# Patient Record
Sex: FEMALE | Race: WHITE
[De-identification: names, ages, dates, MRNs, and addresses within clinical notes are randomized per-mention and may not be internally consistent; named-entity substitution may affect disease eponyms.]

---

## 2018-03-01 ENCOUNTER — HOSPITAL ENCOUNTER (INPATIENT)
Dept: HOSPITAL 62 - ER | Age: 73
LOS: 5 days | Discharge: HOME | DRG: 389 | End: 2018-03-06
Attending: STUDENT IN AN ORGANIZED HEALTH CARE EDUCATION/TRAINING PROGRAM | Admitting: STUDENT IN AN ORGANIZED HEALTH CARE EDUCATION/TRAINING PROGRAM
Payer: MEDICARE

## 2018-03-01 DIAGNOSIS — Z79.02: ICD-10-CM

## 2018-03-01 DIAGNOSIS — Z85.3: ICD-10-CM

## 2018-03-01 DIAGNOSIS — Z88.5: ICD-10-CM

## 2018-03-01 DIAGNOSIS — Z90.710: ICD-10-CM

## 2018-03-01 DIAGNOSIS — Z88.8: ICD-10-CM

## 2018-03-01 DIAGNOSIS — K59.09: ICD-10-CM

## 2018-03-01 DIAGNOSIS — K56.699: Primary | ICD-10-CM

## 2018-03-01 DIAGNOSIS — Z90.12: ICD-10-CM

## 2018-03-01 DIAGNOSIS — N39.0: ICD-10-CM

## 2018-03-01 DIAGNOSIS — Z79.899: ICD-10-CM

## 2018-03-01 DIAGNOSIS — E87.1: ICD-10-CM

## 2018-03-01 DIAGNOSIS — M46.97: ICD-10-CM

## 2018-03-01 DIAGNOSIS — Z88.6: ICD-10-CM

## 2018-03-01 DIAGNOSIS — Z98.84: ICD-10-CM

## 2018-03-01 DIAGNOSIS — I48.91: ICD-10-CM

## 2018-03-01 DIAGNOSIS — E11.9: ICD-10-CM

## 2018-03-01 DIAGNOSIS — I10: ICD-10-CM

## 2018-03-01 LAB
ADD MANUAL DIFF: NO
ALBUMIN SERPL-MCNC: 4.1 G/DL (ref 3.5–5)
ALP SERPL-CCNC: 82 U/L (ref 38–126)
ALT SERPL-CCNC: 40 U/L (ref 9–52)
ANION GAP SERPL CALC-SCNC: 14 MMOL/L (ref 5–19)
APPEARANCE UR: CLEAR
APTT PPP: YELLOW S
AST SERPL-CCNC: 24 U/L (ref 14–36)
BASOPHILS # BLD AUTO: 0 10^3/UL (ref 0–0.2)
BASOPHILS NFR BLD AUTO: 0.3 % (ref 0–2)
BILIRUB DIRECT SERPL-MCNC: 0.1 MG/DL (ref 0–0.4)
BILIRUB SERPL-MCNC: 0.5 MG/DL (ref 0.2–1.3)
BILIRUB UR QL STRIP: NEGATIVE
BUN SERPL-MCNC: 19 MG/DL (ref 7–20)
CALCIUM: 10 MG/DL (ref 8.4–10.2)
CHLORIDE SERPL-SCNC: 98 MMOL/L (ref 98–107)
CO2 SERPL-SCNC: 24 MMOL/L (ref 22–30)
EOSINOPHIL # BLD AUTO: 0.1 10^3/UL (ref 0–0.6)
EOSINOPHIL NFR BLD AUTO: 0.7 % (ref 0–6)
ERYTHROCYTE [DISTWIDTH] IN BLOOD BY AUTOMATED COUNT: 14.1 % (ref 11.5–14)
GLUCOSE SERPL-MCNC: 124 MG/DL (ref 75–110)
GLUCOSE UR STRIP-MCNC: NEGATIVE MG/DL
HCT VFR BLD CALC: 41 % (ref 36–47)
HGB BLD-MCNC: 14 G/DL (ref 12–15.5)
KETONES UR STRIP-MCNC: NEGATIVE MG/DL
LIPASE SERPL-CCNC: 212.4 U/L (ref 23–300)
LYMPHOCYTES # BLD AUTO: 3 10^3/UL (ref 0.5–4.7)
LYMPHOCYTES NFR BLD AUTO: 29.6 % (ref 13–45)
MCH RBC QN AUTO: 30.3 PG (ref 27–33.4)
MCHC RBC AUTO-ENTMCNC: 34.1 G/DL (ref 32–36)
MCV RBC AUTO: 89 FL (ref 80–97)
MONOCYTES # BLD AUTO: 0.7 10^3/UL (ref 0.1–1.4)
MONOCYTES NFR BLD AUTO: 6.6 % (ref 3–13)
NEUTROPHILS # BLD AUTO: 6.5 10^3/UL (ref 1.7–8.2)
NEUTS SEG NFR BLD AUTO: 62.8 % (ref 42–78)
NITRITE UR QL STRIP: NEGATIVE
PH UR STRIP: 6 [PH] (ref 5–9)
PLATELET # BLD: 213 10^3/UL (ref 150–450)
POTASSIUM SERPL-SCNC: 4 MMOL/L (ref 3.6–5)
PROT SERPL-MCNC: 6.2 G/DL (ref 6.3–8.2)
PROT UR STRIP-MCNC: NEGATIVE MG/DL
RBC # BLD AUTO: 4.62 10^6/UL (ref 3.72–5.28)
SODIUM SERPL-SCNC: 135.5 MMOL/L (ref 137–145)
SP GR UR STRIP: 1.01
TOTAL CELLS COUNTED % (AUTO): 100 %
UROBILINOGEN UR-MCNC: NEGATIVE MG/DL (ref ?–2)
WBC # BLD AUTO: 10.3 10^3/UL (ref 4–10.5)

## 2018-03-01 PROCEDURE — 36415 COLL VENOUS BLD VENIPUNCTURE: CPT

## 2018-03-01 PROCEDURE — 93005 ELECTROCARDIOGRAM TRACING: CPT

## 2018-03-01 PROCEDURE — 76705 ECHO EXAM OF ABDOMEN: CPT

## 2018-03-01 PROCEDURE — 80053 COMPREHEN METABOLIC PANEL: CPT

## 2018-03-01 PROCEDURE — 87186 SC STD MICRODIL/AGAR DIL: CPT

## 2018-03-01 PROCEDURE — 83735 ASSAY OF MAGNESIUM: CPT

## 2018-03-01 PROCEDURE — 83605 ASSAY OF LACTIC ACID: CPT

## 2018-03-01 PROCEDURE — 93010 ELECTROCARDIOGRAM REPORT: CPT

## 2018-03-01 PROCEDURE — 81001 URINALYSIS AUTO W/SCOPE: CPT

## 2018-03-01 PROCEDURE — 96375 TX/PRO/DX INJ NEW DRUG ADDON: CPT

## 2018-03-01 PROCEDURE — 85027 COMPLETE CBC AUTOMATED: CPT

## 2018-03-01 PROCEDURE — 87086 URINE CULTURE/COLONY COUNT: CPT

## 2018-03-01 PROCEDURE — 74181 MRI ABDOMEN W/O CONTRAST: CPT

## 2018-03-01 PROCEDURE — 85025 COMPLETE CBC W/AUTO DIFF WBC: CPT

## 2018-03-01 PROCEDURE — 74177 CT ABD & PELVIS W/CONTRAST: CPT

## 2018-03-01 PROCEDURE — 80048 BASIC METABOLIC PNL TOTAL CA: CPT

## 2018-03-01 PROCEDURE — 96376 TX/PRO/DX INJ SAME DRUG ADON: CPT

## 2018-03-01 PROCEDURE — 99285 EMERGENCY DEPT VISIT HI MDM: CPT

## 2018-03-01 PROCEDURE — G0378 HOSPITAL OBSERVATION PER HR: HCPCS

## 2018-03-01 PROCEDURE — 84484 ASSAY OF TROPONIN QUANT: CPT

## 2018-03-01 PROCEDURE — 96365 THER/PROPH/DIAG IV INF INIT: CPT

## 2018-03-01 PROCEDURE — 74018 RADEX ABDOMEN 1 VIEW: CPT

## 2018-03-01 PROCEDURE — 87088 URINE BACTERIA CULTURE: CPT

## 2018-03-01 PROCEDURE — 83690 ASSAY OF LIPASE: CPT

## 2018-03-01 RX ADMIN — APIXABAN SCH MG: 5 TABLET, FILM COATED ORAL at 22:24

## 2018-03-01 RX ADMIN — Medication SCH ML: at 22:25

## 2018-03-01 RX ADMIN — AMLODIPINE BESYLATE SCH MG: 5 TABLET ORAL at 23:06

## 2018-03-01 RX ADMIN — SODIUM CHLORIDE PRN ML: 0.45 INJECTION, SOLUTION INTRAVENOUS at 23:06

## 2018-03-01 RX ADMIN — DEXAMETHASONE SODIUM PHOSPHATE PRN MG: 10 INJECTION INTRAMUSCULAR; INTRAVENOUS at 22:25

## 2018-03-01 NOTE — RADIOLOGY REPORT (SQ)
EXAM DESCRIPTION:  MRI ABDOMEN WITHOUT



COMPLETED DATE/TIME:  3/1/2018 11:17 am



REASON FOR STUDY:  choledocolithiasis? CT measured 0.3cm, RUQ neg



COMPARISON:  None.



TECHNIQUE:  Multiplanar multisequence imaging performed without contrast including sagittal, axial an
d coronal T2, axial T1, axial gradient fat sat T1, and axial in and out of phase sequences.  Addition
al MRCP images also acquired.



CONTRAST TYPE AND DOSE:  Noncontrast study.



RENAL FUNCTION:  Not applicable.



LIMITATIONS:  None.



FINDINGS:  LIVER: Normal size. No masses.  No dilated ducts. CBD normal.

BILE DUCTS:  No ductal dilation.  Common bile duct measures 5 mm.  No filling defects.

SPLEEN: Normal size. No focal lesions.

PANCREAS: No masses. No adjacent inflammation or peripancreatic fluid collections. Pancreatic duct no
t dilated.

GALLBLADDER: No masses. No stones. No gallbladder wall thickening or pericholecystic fluid.

ADRENAL GLANDS: No significant masses or asymmetry.

RIGHT KIDNEY AND URETER: No masses. No hydronephrosis.

LEFT KIDNEY AND URETER: No masses. No hydronephrosis.

AORTA AND VESSELS: No aneurysm. No dissection. Renal arteries, SMA, celiac without stenosis.

RETROPERITONEUM: No retroperitoneal adenopathy, hemorrhage or masses.

BOWEL: No visualized masses.  No inflammation.  No significant dilatation.

ABDOMINAL WALL AND PERITONEUM: No hernias.  There is trace free fluid in the right and left upper mavis
drant.

BONES: No acute or significant findings.

OTHER: No other significant finding.



IMPRESSION:  RELATIVELY UNREMARKABLE MRI OF THE ABDOMEN WITHOUT CONTRAST.  TRACE FREE FLUID IN THE UP
PER ABDOMEN OF UNCLEAR ETIOLOGY OR SIGNIFICANCE.  NO OTHER SIGNIFICANT FINDINGS.



TECHNICAL DOCUMENTATION:  JOB ID: [id number]

 2011 [employer]- All Rights Reserved



Reading location - IP/workstation name: [facility]

## 2018-03-01 NOTE — RADIOLOGY REPORT (SQ)
EXAM DESCRIPTION:  U/S ABDOMEN LIMITED W/O DOP



COMPLETED DATE/TIME:  3/1/2018 8:04 am



REASON FOR STUDY:  eval stone location



COMPARISON:  Abdominal CT scan dated 3/1/2018



TECHNIQUE:  Dynamic and static grayscale images acquired of the abdomen and recorded on PACS. Mayao
mimi selected color Doppler and spectral images recorded.



LIMITATIONS:  None.



FINDINGS:  PANCREAS: No masses.  Visualized pancreatic duct normal caliber.

LIVER: No masses. Echotexture normal.

LIVER VASCULATURE: Normal directional flow of the main portal vein.

GALLBLADDER: No stones. Normal wall thickness. No pericholecystic fluid.  The suspected gallstone bert
ntified on the CT is not identified on the basis of the ultrasound examination.

ULTRASOUND-DETECTED SHAIKH'S SIGN: Negative.

INTRAHEPATIC DUCTS AND COMMON DUCT: CBD and intrahepatic ducts normal caliber. No filling defects.

INFERIOR VENA CAVA: Normal flow.

AORTA: No aneurysm.

RIGHT KIDNEY:  Normal size. Normal echogenicity. No solid or suspicious masses. No hydronephrosis. No
 calcifications.

PERITONEAL AND RIGHT PLEURAL SPACE: No ascites or effusions.

OTHER: No other significant findings.



IMPRESSION:  NORMAL RIGHT UPPER QUADRANT ULTRASOUND.  The suspected gallstone identified on the CT is
 not identified on the basis of the ultrasound examination.



TECHNICAL DOCUMENTATION:  JOB ID:  [id number]

 2011 [employer]- All Rights Reserved



Reading location - IP/workstation name: [email or web address]

## 2018-03-01 NOTE — PDOC H&P
History of Present Illness


Admission Date/PCP: 


  03/01/18 13:04





  


PCP: Will need to ask patient. 


Patient complains of: abdominal pain and nausea * 1 day


History of Present Illness: 


JEANNE FAIR is a 72 year old female with history of gastric sleeve, breast 

cancer s/p mastectomy in 2011, HTN who presents to the ED for less than one day 

of acute abdominal pain. States that she was sleeping and woke up with acute 

right sided abdominal pain. Associated symptoms included nausea. Denies fevers, 

chills, CP, SOB, vomiting. States that she had her gastric sleeve one year ago 

at [facility]. Has had no subsequent issues related to sleeve. Denies history of 

gallbladder disease. No recent sick contacts. Diet has been relatively 

unchanged except for increased protein intake with use of high protein drink. 

Her breast cancer is currently in remission and she has normal follow up 

mammograms. 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 

exposed to agent orange. 








Past Medical History


Cardiac Medical History: Reports: Atrial Fibrillation, Hypertension


Endocrine Medical History: Reports: Diabetes Mellitus Type 2


Malignancy Medical History: Reports: Breast Cancer





Past Surgical History


Past Surgical History: Reports: Gastric Bypass Surgery - Gastric sleeve, 

Hysterectomy, Mastectomy





Social History


Information Source: Patient


Lives with: Family


Smoking Status: Never Smoker


Frequency of Alcohol Use: Rare


Hx Recreational Drug Use: No


Drugs: None


Hx Prescription Drug Abuse: No





Family History


Family History: Reviewed & Not Pertinent


Parental Family History Reviewed: No


Children Family History Reviewed: NA


Sibling(s) Family History Reviewed.: NA





Medication/Allergy


Allergies/Adverse Reactions: 


 





acetaminophen [From Percocet] Adverse Reaction (Verified 03/01/18 06:50)


 


hydrocodone [From Norco] Adverse Reaction (Verified 03/01/18 06:50)


 


Opioids - Morphine Analogues Adverse Reaction (Verified 03/01/18 06:50)


 


oxycodone [From Percocet] Adverse Reaction (Verified 03/01/18 06:50)


 











Physical Exam


Vital Signs: 


 











Temp Pulse Resp BP Pulse Ox


 


 97.5 F   58 L  20   177/75 H  100 


 


 03/01/18 04:08  03/01/18 04:08  03/01/18 04:08  03/01/18 04:08  03/01/18 04:08











General appearance: PRESENT: no acute distress, other - Very pleasant, appears 

comfortable


Head exam: PRESENT: normocephalic


Mouth exam: PRESENT: moist


Respiratory exam: PRESENT: unlabored.  ABSENT: tachypnea, wheezes


Cardiovascular exam: PRESENT: RRR, +S1, +S2.  ABSENT: tachycardia


GI/Abdominal exam: PRESENT: normal bowel sounds, soft, tenderness - RLL 

tenderness to deep palation


Extremities exam: ABSENT: tenderness, +1 edema


Neurological exam: PRESENT: alert, awake, CN II-XII grossly intact


Psychiatric exam: PRESENT: appropriate affect





Results


Impressions: 


 





Abdomen/Pelvis CT  03/01/18 04:25


IMPRESSION:


 


1.  Moderately mesenteric fat inflammation of the right


paracentral abdomen. Infectious, inflammatory, neoplastic


processes are in the differential diagnosis.


2.  Possible 0.3 cm cholelithiasis/choledocholithiasis.


3.  Grade 2 L5 anterolisthesis with chronic bilateral L5


spondylolyses, and moderate-severe bilateral L5 foraminal


stenoses.


 


 








Abdomen Ultrasound  03/01/18 06:54


IMPRESSION:  NORMAL RIGHT UPPER QUADRANT ULTRASOUND.  The suspected gallstone 

identified on the CT is not identified on the basis of the ultrasound 

examination.


 








Abdomen MRI  03/01/18 08:47


IMPRESSION:  RELATIVELY UNREMARKABLE MRI OF THE ABDOMEN WITHOUT CONTRAST.  

TRACE FREE FLUID IN THE UPPER ABDOMEN OF UNCLEAR ETIOLOGY OR SIGNIFICANCE.  NO 

OTHER SIGNIFICANT FINDINGS.


 














Assessment & Plan





- Diagnosis


(1) Nausea and vomiting


Qualifiers: 


   Vomiting type: unspecified   Vomiting Intractability: non-intractable   

Qualified Code(s): R11.2 - Nausea with vomiting, unspecified   


Is this a current diagnosis for this admission?: Yes   


Plan: 


Acute onset of right sided abdominal pain with nausea


Work up:


- Labs largely unremarkable. WBC, lipase, LFTs all wnl


- CT AP showed moderate mesenteric fat inflammation of the right paracentral 

abdomen. Also notes 0.3 cholelithasis


- RUQ - no evidence of gallstone


- MRCP without contrast - no clear abnormality with trace free fluid





Unclear etiology. this may be a stone that has passed. Would expect to see 

ductal dilitation if there was previous stone. No evidence of cholecystitis, 

pancreatitis, or other intra=abdominal pathology. CT did show mesenteric fat 

inflammation, however unclear significance of this





- Will continue supportive care for now with IVF 75cc, clear liquids (ADAT), IV 

Zofran prn


- If abdominal pain persists/worsens, will consult GI for consideration of ERCP


- Admit for observation








(2) Breast cancer


Qualifiers: 


   Breast location: unspecified site of breast   Estrogen receptor status: 

positive   Patient sex: female   Laterality: unspecified laterality   Qualified 

Code(s): C50.919 - Malignant neoplasm of unspecified site of unspecified female 

breast; Z17.0 - Estrogen receptor positive status [ER+]; Z17.0 - Estrogen 

receptor positive status [ER+]   


Is this a current diagnosis for this admission?: No   


Plan: 


Previous history, s/p mastectomy without adjuvant therapy


- Has had follow up mammograms and states no evidence of recurrence








(3) Gastric bypass status for obesity


Is this a current diagnosis for this admission?: No   


Plan: 


History of gastric sleeve 1 year ago


- No active issues


- Followed at [facility]








(4) HTN (hypertension)


Qualifiers: 


   Hypertension type: essential hypertension   Qualified Code(s): I10 - 

Essential (primary) hypertension   


Is this a current diagnosis for this admission?: Yes   


Plan: 


Blood pressure elevated currently. Likely worsened due to pain


- CTM


- Continue home Amlodipine, Metoprolol, and Olmesartan


- Will add Hydralazine 10mg IV q6 hours 








- Time


Time Spent: 50 to 70 Minutes


Anticipated discharge: Home


Within: within 24 hours

## 2018-03-01 NOTE — ER DOCUMENT REPORT
ED GI/





- General


TRAVEL OUTSIDE OF THE [place] IN LAST 30 DAYS: No





<SINAN MANCILLA - Last Filed: 03/01/18 07:17>





<HINA OLSEN - Last Filed: 03/01/18 12:49>





- General


Chief Complaint: Abdominal Pain


Stated Complaint: ABDOMINAL PAIN


Time Seen by Provider: 03/01/18 04:16


Notes: 


Patient is a 72-year-old female that comes emergency department for chief 

complaint of sharp upper abdominal pain, she states she feels like it shoots 

into her right back or shoulder occasionally as well.  She states pain started 

suddenly at 1 AM it woke her up.  She denies vomiting, she reports intermittent 

nausea, she denies lower abdominal pain, chest pain, fever or chills.  Past 

medical history of gastric sleeve, hysterectomy, left breast removal secondary 

to cancer, she is also on Eliquis for atrial fibrillation.


 (SINAN MANCILLA)





- Related Data


Allergies/Adverse Reactions: 


 





acetaminophen [From Percocet] Adverse Reaction (Verified 03/01/18 06:50)


 


hydrocodone [From Norco] Adverse Reaction (Verified 03/01/18 06:50)


 


Opioids - Morphine Analogues Adverse Reaction (Verified 03/01/18 06:50)


 


oxycodone [From Percocet] Adverse Reaction (Verified 03/01/18 06:50)


 











Past Medical History





- General


Information source: Patient, Relative





- Social History


Smoking Status: Never Smoker


Frequency of alcohol use: None


Drug Abuse: None


Lives with: Family


Family History: Reviewed & Not Pertinent





- Past Medical History


Cardiac Medical History: Reports: Hx Atrial Fibrillation


Malignancy Medical History: Reports: Hx Breast Cancer


Past Surgical History: Reports: Hx Breast Surgery - Left breast removed, Hx 

Gastric Bypass Surgery - Gastric sleeve, Hx Hysterectomy





<SINAN MANCILLA - Last Filed: 03/01/18 07:17>





Review of Systems





- Review of Systems


Constitutional: No symptoms reported


EENT: No symptoms reported


Cardiovascular: No symptoms reported


Respiratory: No symptoms reported


Gastrointestinal: See HPI


Genitourinary: No symptoms reported


Female Genitourinary: No symptoms reported


Musculoskeletal: No symptoms reported


Skin: No symptoms reported


Hematologic/Lymphatic: No symptoms reported


Neurological/Psychological: No symptoms reported





<SINAN MANCILLA - Last Filed: 03/01/18 07:17>





Physical Exam





- General


General appearance: Anxious


In distress: Moderate - Patient obviously uncomfortable





- HEENT


Head: Normocephalic, Atraumatic


Eyes: Normal


Conjunctiva: Normal


Extraocular movements intact: Yes


Eyelashes: Normal


Pupils: PERRL


Nasal: Normal


Mouth/Lips: Normal


Mucous membranes: Normal


Pharynx: Normal


Neck: Normal





- Respiratory


Respiratory status: No respiratory distress.  No: Respiratory distress, Labored

, Tachypnea


Breath sounds: Normal.  No: Decreased air movement, Wheezing





- Cardiovascular


Rhythm: Regular.  No: Tachycardia


Heart sounds: Normal auscultation, S1 appreciated, S2 appreciated


Murmur: No


Normal capillary refill: Yes





- Abdominal


Inspection: Normal


Distension: No distension


Tenderness: Tender - Very tender across the upper abdomen generally, most 

tender in the epigastric area, lower abdomen with minimal tenderness, Guarding





- Back


Back: Normal.  No: Tender





- Extremities


General upper extremity: Normal inspection, Nontender, Normal strength, Normal 

temperature


General lower extremity: Normal inspection, Nontender, Normal strength, Normal 

temperature.  No: Edema





- Neurological


Neuro grossly intact: Yes


Cognition: Normal


Orientation: AAOx4


L[place]ty Coma Scale Eye Opening: Spontaneous


[place]ty Coma Scale Verbal: Oriented


Maninder Coma Scale Motor: Obeys Commands


Maninder Coma Scale Total: 15


Speech: Normal


Motor strength normal: LUE, RUE, LLE, RLE


Sensory: Normal





- Skin


Skin Temperature: Warm


Skin Moisture: Dry


Skin Color: Normal





<SINAN MANCILLA - Last Filed: 03/01/18 07:17>





- Vital signs


Vitals: 





 











Temp Pulse Resp BP Pulse Ox


 


 97.5 F   58 L  20   177/75 H  100 


 


 03/01/18 04:08  03/01/18 04:08  03/01/18 04:08  03/01/18 04:08  03/01/18 04:08














Course





- Laboratory


Result Diagrams: 


 03/01/18 04:45





 03/01/18 04:45





<SINAN MANCILLA - Last Filed: 03/01/18 07:17>





- Laboratory


Result Diagrams: 


 03/01/18 04:45





 03/01/18 04:45





<HINA OLSEN - Last Filed: 03/01/18 12:49>





- Re-evaluation


Re-evalutation: 


Patient is noticeably uncomfortable on initial evaluation, concern for possible 

perforation due to sudden onset of pain, pain in her upper abdomen generally 

with some guarding, could be cholecystitis or gallbladder pain although will 

begin with CAT scan with IV contrast to rule out perforation first and then may 

be progress to ultrasound if needed. 





03/01/18


Patient had some relief of initial pain with the small amount of fentanyl, 

however now she is vomiting.  Will not be able to tolerate oral contrast. 

Checking troponin and EKG as well.  Patient requests Toradol, she has had this 

in the past, creatinine is normal.  Giving small dose.





03/01/18


CT showing nonspecific mesenteric stranding which could be infectious, 

inflammatory, or cancerous.  Patient does have a history of breast cancer, 

however with her acute pain she will be covered with Zosyn at this time, CT 

also indicates possible 0.3 cm stone either in the gallbladder or in the ducts, 

ultrasound will be performed to further evaluate.





03/01/18 07:20


Patient was introduced to Mell Olsen PA-C at bedside, final workup pending. 

(SINAN MANCILLA)








03/01/18 12:47


MRCP was ordered due to possibility of choledocholithiasis today on CAT scan.  

MRCP negative for any acute pathology or any findings of abnormality really.  

Dr. Guzman, hospitalist agrees to admit at this time for further workup as we 

have no reason at this time to transfer her with negative workup of the 

gallbladder, she has been with us in the ER for 9 hours, received a CAT scan, 

ultrasound and MRI at this time we still do not have a clear answer as to what 

is going on but patient has required repeat pain medication and nausea 

medication here. (HINA OLSEN)





- Vital Signs


Vital signs: 





 











Temp Pulse Resp BP Pulse Ox


 


 97.5 F   58 L  20   177/75 H  100 


 


 03/01/18 04:08  03/01/18 04:08  03/01/18 04:08  03/01/18 04:08  03/01/18 04:08














- Laboratory


Laboratory results interpreted by me: 





 











  03/01/18 03/01/18 03/01/18





  04:45 04:45 05:00


 


RDW  14.1 H  


 


Sodium   135.5 L 


 


Glucose   124 H 


 


Total Protein   6.2 L 


 


Ur Leukocyte Esterase    LARGE H














Discharge





<SINAN MANCILLA - Last Filed: 03/01/18 07:17>





- Discharge


Admitting Provider: Hospitalist - brandan


Unit Admitted: Medical Floor





<HINA OLSEN - Last Filed: 03/01/18 12:49>





- Discharge


Clinical Impression: 


 Epigastric pain





Nausea and vomiting


Qualifiers:


 Vomiting type: unspecified Vomiting Intractability: non-intractable Qualified 

Code(s): R11.2 - Nausea with vomiting, unspecified





Condition: Stable


Disposition: ADMITTED AS INPATIENT

## 2018-03-01 NOTE — EKG REPORT
SEVERITY:- ABNORMAL ECG -

SINUS RHYTHM

NONSPECIFIC ST-T CHANGES  ANTERIOR LEADS.

:

Confirmed by: Germán Nelson MD 01-Mar-2018 09:34:44

## 2018-03-01 NOTE — RADIOLOGY REPORT (SQ)
EXAM DESCRIPTION:

CT ABD/PELVIS WITH IV ONLY



CLINICAL HISTORY:

72 years Female, severe upper abdominal pain



COMPARISON:

None.



TECHNIQUE:

71 mL Isovue-370 contrast.  Coronal and sagittal reformat.  This

exam was performed according to our departmental

dose-optimization program, which includes automated exposure

control, adjustment of the mA and/or kV according to patient size

and/or use of iterative reconstruction technique.

Limited: No delay sequence of the renal system due to technical

problems with the CT scanner.



FINDINGS:



Moderate, extensively inflamed mesenteric fat of the right

paracentral abdomen. No significant free fluid.



0.3 cm calcification near the gallbladder neck may indicate

gallstone or choledocholithiasis.



Small hiatal hernia. Atherosclerosis. Gastric suture. Moderate

levo convexity. Mammary prostheses. Moderate coronary arterial

calcification. Calcified right buttock granuloma. Grade 2 L5

anterolisthesis with chronic bilateral L5 spondylolyses, and

moderate-severe bilateral L5 foraminal stenoses.



Inferior thorax, liver, pancreas, spleen, adrenals, kidneys,

gastrointestinal tract, pelvic organs, lymphatics, vasculature,

and musculoskeleton appear otherwise unremarkable.   



IMPRESSION:



1.  Moderately mesenteric fat inflammation of the right

paracentral abdomen. Infectious, inflammatory, neoplastic

processes are in the differential diagnosis.

2.  Possible 0.3 cm cholelithiasis/choledocholithiasis.

3.  Grade 2 L5 anterolisthesis with chronic bilateral L5

spondylolyses, and moderate-severe bilateral L5 foraminal

stenoses.

## 2018-03-02 LAB
ANION GAP SERPL CALC-SCNC: 11 MMOL/L (ref 5–19)
BUN SERPL-MCNC: 10 MG/DL (ref 7–20)
CALCIUM: 9.5 MG/DL (ref 8.4–10.2)
CHLORIDE SERPL-SCNC: 92 MMOL/L (ref 98–107)
CO2 SERPL-SCNC: 25 MMOL/L (ref 22–30)
ERYTHROCYTE [DISTWIDTH] IN BLOOD BY AUTOMATED COUNT: 14 % (ref 11.5–14)
GLUCOSE SERPL-MCNC: 144 MG/DL (ref 75–110)
HCT VFR BLD CALC: 41.8 % (ref 36–47)
HGB BLD-MCNC: 13.9 G/DL (ref 12–15.5)
MCH RBC QN AUTO: 29.9 PG (ref 27–33.4)
MCHC RBC AUTO-ENTMCNC: 33.3 G/DL (ref 32–36)
MCV RBC AUTO: 90 FL (ref 80–97)
PLATELET # BLD: 215 10^3/UL (ref 150–450)
POTASSIUM SERPL-SCNC: 3.9 MMOL/L (ref 3.6–5)
RBC # BLD AUTO: 4.65 10^6/UL (ref 3.72–5.28)
SODIUM SERPL-SCNC: 127.5 MMOL/L (ref 137–145)
WBC # BLD AUTO: 11.1 10^3/UL (ref 4–10.5)

## 2018-03-02 RX ADMIN — APIXABAN SCH MG: 5 TABLET, FILM COATED ORAL at 17:27

## 2018-03-02 RX ADMIN — KETOROLAC TROMETHAMINE PRN MG: 30 INJECTION, SOLUTION INTRAMUSCULAR at 00:12

## 2018-03-02 RX ADMIN — HYDROMORPHONE HYDROCHLORIDE PRN MG: 2 INJECTION INTRAMUSCULAR; INTRAVENOUS; SUBCUTANEOUS at 11:10

## 2018-03-02 RX ADMIN — LOSARTAN POTASSIUM SCH MG: 50 TABLET, FILM COATED ORAL at 11:12

## 2018-03-02 RX ADMIN — KETOROLAC TROMETHAMINE PRN MG: 30 INJECTION, SOLUTION INTRAMUSCULAR at 23:59

## 2018-03-02 RX ADMIN — KETOROLAC TROMETHAMINE PRN MG: 30 INJECTION, SOLUTION INTRAMUSCULAR at 07:39

## 2018-03-02 RX ADMIN — HYDROMORPHONE HYDROCHLORIDE PRN MG: 2 INJECTION INTRAMUSCULAR; INTRAVENOUS; SUBCUTANEOUS at 22:10

## 2018-03-02 RX ADMIN — Medication SCH ML: at 22:11

## 2018-03-02 RX ADMIN — DEXAMETHASONE SODIUM PHOSPHATE PRN MG: 10 INJECTION INTRAMUSCULAR; INTRAVENOUS at 16:51

## 2018-03-02 RX ADMIN — DEXAMETHASONE SODIUM PHOSPHATE PRN MG: 10 INJECTION INTRAMUSCULAR; INTRAVENOUS at 04:25

## 2018-03-02 RX ADMIN — AMLODIPINE BESYLATE SCH MG: 5 TABLET ORAL at 11:11

## 2018-03-02 RX ADMIN — LANSOPRAZOLE SCH MG: 30 TABLET, ORALLY DISINTEGRATING, DELAYED RELEASE ORAL at 05:56

## 2018-03-02 RX ADMIN — APIXABAN SCH MG: 5 TABLET, FILM COATED ORAL at 11:11

## 2018-03-02 RX ADMIN — HYDROMORPHONE HYDROCHLORIDE PRN MG: 2 INJECTION INTRAMUSCULAR; INTRAVENOUS; SUBCUTANEOUS at 17:43

## 2018-03-02 RX ADMIN — DEXAMETHASONE SODIUM PHOSPHATE PRN MG: 10 INJECTION INTRAMUSCULAR; INTRAVENOUS at 22:15

## 2018-03-02 RX ADMIN — POLYETHYLENE GLYCOL 3350 SCH GM: 17 POWDER, FOR SOLUTION ORAL at 11:11

## 2018-03-02 RX ADMIN — AMLODIPINE BESYLATE SCH MG: 5 TABLET ORAL at 22:10

## 2018-03-02 RX ADMIN — DIPHENHYDRAMINE HYDROCHLORIDE PRN MG: 25 CAPSULE ORAL at 22:10

## 2018-03-02 RX ADMIN — CEFTRIAXONE SODIUM SCH MG: 1 INJECTION, POWDER, FOR SOLUTION INTRAMUSCULAR; INTRAVENOUS at 17:43

## 2018-03-02 RX ADMIN — LETROZOLE SCH MG: 2.5 TABLET, FILM COATED ORAL at 11:15

## 2018-03-02 RX ADMIN — Medication SCH ML: at 05:53

## 2018-03-02 RX ADMIN — Medication SCH ML: at 16:19

## 2018-03-02 NOTE — PDOC PROGRESS REPORT
Subjective


Progress Note for:: 03/02/18


Subjective:: 


Had abdominal pain overnight. Received pain medications and lactulose by 

overnight doctors with only minimal improvement. This AM continues to have 

abdominal pain, most in Right lower quadrant. Has not had BM in 2-3 days. 

Continues to dry heave but no vomiting. Denies fevers, chills, CP, SOB. 


Reason For Visit: 


INTRACTABLE ABDOMINAL PAIN AND NAUSEA








Physical Exam


Vital Signs: 


 











Temp Pulse Resp BP Pulse Ox


 


 98.9 F   71   17   147/62 H  95 


 


 03/02/18 15:59  03/02/18 15:59  03/02/18 15:59  03/02/18 15:59  03/02/18 15:59








 Intake & Output











 03/01/18 03/02/18 03/03/18





 06:59 06:59 06:59


 


Intake Total  6 


 


Output Total   500


 


Balance  6 -500


 


Weight  64.5 kg 











General appearance: PRESENT: no acute distress, well-developed, well-nourished


Head exam: PRESENT: normocephalic


Mouth exam: PRESENT: moist


Respiratory exam: PRESENT: unlabored.  ABSENT: tachypnea, wheezes


Cardiovascular exam: PRESENT: +S1, +S2, tachycardia


GI/Abdominal exam: PRESENT: normal bowel sounds, soft, tenderness - LLQ.  ABSENT

: guarding


Extremities exam: ABSENT: pedal edema


Musculoskeletal exam: PRESENT: full ROM


Neurological exam: PRESENT: alert, awake, CN II-XII grossly intact


Psychiatric exam: PRESENT: appropriate affect





Results


Laboratory Results: 


 





 03/02/18 04:26 





 03/02/18 04:26 





 











  03/02/18 03/02/18





  04:26 04:26


 


WBC  11.1 H 


 


RBC  4.65 


 


Hgb  13.9 


 


Hct  41.8 


 


MCV  90 


 


MCH  29.9 


 


MCHC  33.3 


 


RDW  14.0 


 


Plt Count  215 


 


Sodium   127.5 L


 


Potassium   3.9


 


Chloride   92 L


 


Carbon Dioxide   25


 


Anion Gap   11


 


BUN   10


 


Creatinine   0.45 L


 


Est GFR ( Amer)   > 60


 


Est GFR (Non-Af Amer)   > 60


 


Glucose   144 H


 


Calcium   9.5











Impressions: 


 





Abdomen/Pelvis CT  03/01/18 04:25


IMPRESSION:


 


1.  Moderately mesenteric fat inflammation of the right


paracentral abdomen. Infectious, inflammatory, neoplastic


processes are in the differential diagnosis.


2.  Possible 0.3 cm cholelithiasis/choledocholithiasis.


3.  Grade 2 L5 anterolisthesis with chronic bilateral L5


spondylolyses, and moderate-severe bilateral L5 foraminal


stenoses.


 


 








Abdomen Ultrasound  03/01/18 06:54


IMPRESSION:  NORMAL RIGHT UPPER QUADRANT ULTRASOUND.  The suspected gallstone 

identified on the CT is not identified on the basis of the ultrasound 

examination.


 








Abdomen MRI  03/01/18 08:47


IMPRESSION:  RELATIVELY UNREMARKABLE MRI OF THE ABDOMEN WITHOUT CONTRAST.  

TRACE FREE FLUID IN THE UPPER ABDOMEN OF UNCLEAR ETIOLOGY OR SIGNIFICANCE.  NO 

OTHER SIGNIFICANT FINDINGS.


 














Assessment & Plan





- Diagnosis


(1) Nausea and vomiting


Qualifiers: 


   Vomiting type: unspecified   Vomiting Intractability: non-intractable   

Qualified Code(s): R11.2 - Nausea with vomiting, unspecified   


Is this a current diagnosis for this admission?: Yes   


Plan: 


Acute onset of right sided abdominal pain with nausea


Work up:


- Labs largely unremarkable. WBC, lipase, LFTs all wnl


- CT AP showed moderate mesenteric fat inflammation of the right paracentral 

abdomen. Also notes 0.3 cholelithasis


- RUQ - no evidence of gallstone


- MRCP without contrast - no clear abnormality with trace free fluid





3/2 update


- Seen by general surgery, did not feel there was acute pathology for surgical 

intervention


- UA positive for LE, negative for nitrites, few bacteria


- Urine culture: + GNR, will await count and speciation


- Will give bowel regimen: emema + Miralax


- For (questionable) UTI, started Ceftriaxone 1g 24 hours IV


- No role for GI consult at this time








(2) Breast cancer


Qualifiers: 


   Breast location: unspecified site of breast   Estrogen receptor status: 

positive   Patient sex: female   Laterality: unspecified laterality   Qualified 

Code(s): C50.919 - Malignant neoplasm of unspecified site of unspecified female 

breast; Z17.0 - Estrogen receptor positive status [ER+]; Z17.0 - Estrogen 

receptor positive status [ER+]   


Is this a current diagnosis for this admission?: No   


Plan: 


Previous history, s/p mastectomy, currently on Letrozole


- Has had follow up mammograms and states no evidence of recurrence








(3) Gastric bypass status for obesity


Is this a current diagnosis for this admission?: No   


Plan: 


History of gastric sleeve 1 year ago


- No active issues


- Followed at [facility]








(4) HTN (hypertension)


Qualifiers: 


   Hypertension type: essential hypertension   Qualified Code(s): I10 - 

Essential (primary) hypertension   


Is this a current diagnosis for this admission?: Yes   


Plan: 


Blood pressure elevated currently. Likely worsened due to pain


- CTM


- Continue home Amlodipine, Metoprolol, and Olmesartan


- Will add Hydralazine 10mg IV q6 hours 








- Time


Time Spent with patient: 25-34 minutes


Anticipated discharge: Home


Within: within 24 hours

## 2018-03-02 NOTE — PDOC CONSULTATION
Consultation


Consult Date: 03/02/18


Consult reason:: RLQ pains





History of Present Illness


Admission Date/PCP: 


  03/01/18 13:04





  





Patient complains of: rlq PAINS


History of Present Illness: 





73 yo female post Lap Band then Sleeve Gastrectomy a year ago, post total 

hysterectomy woke up with severe RLQ pains radiating to the back at 1 am past 2 

days. Pains associated with nausea. Pains are spasmodic. Denies fever,chills, 

dysuria. Has constipation.


Had multiple studies including US GB,CT scan of Abd/pelvis as well as MRI of 

the abdomen.Only + finding is inflammation of the right mesenteric area on CT 

Scan but not seen on MRI. MRI showed some fluid in the pelvis.


Urine C/S has gm- cocci.





Past Medical History


Cardiac Medical History: Reports: Atrial Fibrillation, Hypertension


Endocrine Medical History: Reports: Diabetes Mellitus Type 2


Malignancy Medical History: Reports: Breast Cancer





Past Surgical History


Past Surgical History: Reports: Gastric Bypass Surgery - Gastric sleeve, 

Hysterectomy, Mastectomy





Social History


Lives with: Family


Smoking Status: Never Smoker


Frequency of Alcohol Use: None


Hx Recreational Drug Use: No


Drugs: None


Hx Prescription Drug Abuse: No





- Advance Directive


Resuscitation Status: Full Code





Family History


Family History: Reviewed & Not Pertinent


Parental Family History Reviewed: No


Children Family History Reviewed: No


Sibling(s) Family History Reviewed.: No





Medication/Allergy


Home Medications: 








Amlodipine Besylate [Norvasc 5 mg Tablet] 5 mg PO BID 03/01/18 


Apixaban [Eliquis 5 mg Tablet] 5 mg PO BID 03/01/18 


Letrozole [Femara 2.5 Mg Tablet] 2.5 mg PO DAILY 03/01/18 


Metoprolol Succinate [Toprol  mg Tablet] 100 mg PO DAILY 03/01/18 


Olmesartan Medoxomil [Benicar] 40 mg PO DAILY 03/01/18 


Omeprazole 40 mg PO DAILY 03/01/18 








Allergies/Adverse Reactions: 


 





acetaminophen [From Percocet] Adverse Reaction (Verified 03/01/18 06:50)


 


hydrocodone [From Norco] Adverse Reaction (Verified 03/01/18 06:50)


 


Opioids - Morphine Analogues Adverse Reaction (Verified 03/01/18 06:50)


 


oxycodone [From Percocet] Adverse Reaction (Verified 03/01/18 06:50)


 











Review of Systems


Constitutional: PRESENT: as per HPI


Eyes: PRESENT: other - no visual/hearing problems


Cardiovascular: PRESENT: other - no chest pains/dyspnea


Gastrointestinal: PRESENT: abdominal pain, constipation, nausea


Genitourinary: PRESENT: other - no dysuria


Musculoskeletal: PRESENT: other - no joint pains


Integumentary: PRESENT: other - dark discoloration right thigh due to a bump


Neurological: PRESENT: other - no seizures


Endocrine: PRESENT: other - no polyuria


Hematologic/Lymphatic: PRESENT: other - noeasy bruising





Physical Exam


Vital Signs: 


 











Temp Pulse Resp BP Pulse Ox


 


 98.3 F   60   17   149/66 H  98 


 


 03/02/18 11:58  03/02/18 11:58  03/02/18 11:58  03/02/18 11:58  03/02/18 11:58








 Intake & Output











 03/01/18 03/02/18 03/03/18





 06:59 06:59 06:59


 


Intake Total  6 


 


Balance  6 


 


Weight  64.5 kg 











General appearance: PRESENT: mild distress


Head exam: PRESENT: atraumatic, normocephalic


Eye exam: PRESENT: conjunctiva pink


Mouth exam: PRESENT: moist


Neck exam: PRESENT: full ROM


Respiratory exam: PRESENT: clear to auscultation isreal


Cardiovascular exam: PRESENT: RRR


Pulses: PRESENT: normal radial pulses


Vascular exam: PRESENT: normal capillary refill


GI/Abdominal exam: PRESENT: soft, tenderness - RLQ


Rectal exam: PRESENT: deferred


Gentrourinary exam: ABSENT: urethral discharge


Extremities exam: PRESENT: full ROM


Musculoskeletal exam: PRESENT: ambulatory


Neurological exam: PRESENT: alert, oriented to person, oriented to place, 

oriented to time, oriented to situation


Psychiatric exam: PRESENT: appropriate affect





Results


Laboratory Results: 


 





 03/02/18 04:26 





 03/02/18 04:26 





 











  03/02/18 03/02/18





  04:26 04:26


 


WBC  11.1 H 


 


RBC  4.65 


 


Hgb  13.9 


 


Hct  41.8 


 


MCV  90 


 


MCH  29.9 


 


MCHC  33.3 


 


RDW  14.0 


 


Plt Count  215 


 


Sodium   127.5 L


 


Potassium   3.9


 


Chloride   92 L


 


Carbon Dioxide   25


 


Anion Gap   11


 


BUN   10


 


Creatinine   0.45 L


 


Est GFR ( Amer)   > 60


 


Est GFR (Non-Af Amer)   > 60


 


Glucose   144 H


 


Calcium   9.5











Impressions: 


 





Abdomen/Pelvis CT  03/01/18 04:25


IMPRESSION:


 


1.  Moderately mesenteric fat inflammation of the right


paracentral abdomen. Infectious, inflammatory, neoplastic


processes are in the differential diagnosis.


2.  Possible 0.3 cm cholelithiasis/choledocholithiasis.


3.  Grade 2 L5 anterolisthesis with chronic bilateral L5


spondylolyses, and moderate-severe bilateral L5 foraminal


stenoses.


 


 








Abdomen Ultrasound  03/01/18 06:54


IMPRESSION:  NORMAL RIGHT UPPER QUADRANT ULTRASOUND.  The suspected gallstone 

identified on the CT is not identified on the basis of the ultrasound 

examination.


 








Abdomen MRI  03/01/18 08:47


IMPRESSION:  RELATIVELY UNREMARKABLE MRI OF THE ABDOMEN WITHOUT CONTRAST.  

TRACE FREE FLUID IN THE UPPER ABDOMEN OF UNCLEAR ETIOLOGY OR SIGNIFICANCE.  NO 

OTHER SIGNIFICANT FINDINGS.


 














Assessment & Plan





- Diagnosis


(1) UTI (urinary tract infection)


Qualifiers: 


   Urinary tract infection type: site unspecified 


Is this a current diagnosis for this admission?: Yes   





- Time


Time Spent: 30 to 50 Minutes





- Inpatient Certification


Medical Necessity: Need for Pain Control





- Plan Summary


Plan Summary: 





Had Parenteral meds just prior to evaluation.Will re-evaluate when pain med 

effect is gone.


Only objective + finding is gm+ cocci in urine


D/W Hospitalist Dr Guzman who said will treat UTI. Patient also informed but 

will re-evaluate after 4-6 hrs from now.

## 2018-03-03 LAB
ADD MANUAL DIFF: NO
ANION GAP SERPL CALC-SCNC: 8 MMOL/L (ref 5–19)
BASOPHILS # BLD AUTO: 0 10^3/UL (ref 0–0.2)
BASOPHILS NFR BLD AUTO: 0.1 % (ref 0–2)
BUN SERPL-MCNC: 10 MG/DL (ref 7–20)
CALCIUM: 8.8 MG/DL (ref 8.4–10.2)
CHLORIDE SERPL-SCNC: 88 MMOL/L (ref 98–107)
CO2 SERPL-SCNC: 26 MMOL/L (ref 22–30)
EOSINOPHIL # BLD AUTO: 0 10^3/UL (ref 0–0.6)
EOSINOPHIL NFR BLD AUTO: 0.2 % (ref 0–6)
ERYTHROCYTE [DISTWIDTH] IN BLOOD BY AUTOMATED COUNT: 13.6 % (ref 11.5–14)
GLUCOSE SERPL-MCNC: 137 MG/DL (ref 75–110)
HCT VFR BLD CALC: 38.5 % (ref 36–47)
HGB BLD-MCNC: 13.3 G/DL (ref 12–15.5)
LYMPHOCYTES # BLD AUTO: 0.9 10^3/UL (ref 0.5–4.7)
LYMPHOCYTES NFR BLD AUTO: 10.6 % (ref 13–45)
MCH RBC QN AUTO: 30.5 PG (ref 27–33.4)
MCHC RBC AUTO-ENTMCNC: 34.5 G/DL (ref 32–36)
MCV RBC AUTO: 88 FL (ref 80–97)
MONOCYTES # BLD AUTO: 0.8 10^3/UL (ref 0.1–1.4)
MONOCYTES NFR BLD AUTO: 9.5 % (ref 3–13)
NEUTROPHILS # BLD AUTO: 6.5 10^3/UL (ref 1.7–8.2)
NEUTS SEG NFR BLD AUTO: 79.6 % (ref 42–78)
PLATELET # BLD: 208 10^3/UL (ref 150–450)
POTASSIUM SERPL-SCNC: 3.9 MMOL/L (ref 3.6–5)
RBC # BLD AUTO: 4.36 10^6/UL (ref 3.72–5.28)
SODIUM SERPL-SCNC: 121.5 MMOL/L (ref 137–145)
TOTAL CELLS COUNTED % (AUTO): 100 %
WBC # BLD AUTO: 8.2 10^3/UL (ref 4–10.5)

## 2018-03-03 RX ADMIN — Medication SCH ML: at 05:11

## 2018-03-03 RX ADMIN — DOCUSATE SODIUM SCH MG: 100 CAPSULE, LIQUID FILLED ORAL at 17:33

## 2018-03-03 RX ADMIN — AMLODIPINE BESYLATE SCH MG: 5 TABLET ORAL at 21:07

## 2018-03-03 RX ADMIN — DIPHENHYDRAMINE HYDROCHLORIDE PRN MG: 25 CAPSULE ORAL at 02:10

## 2018-03-03 RX ADMIN — HYDROMORPHONE HYDROCHLORIDE PRN MG: 2 INJECTION INTRAMUSCULAR; INTRAVENOUS; SUBCUTANEOUS at 11:22

## 2018-03-03 RX ADMIN — Medication SCH ML: at 14:31

## 2018-03-03 RX ADMIN — AMLODIPINE BESYLATE SCH MG: 5 TABLET ORAL at 11:23

## 2018-03-03 RX ADMIN — HYDROMORPHONE HYDROCHLORIDE PRN MG: 2 INJECTION INTRAMUSCULAR; INTRAVENOUS; SUBCUTANEOUS at 06:35

## 2018-03-03 RX ADMIN — DIPHENHYDRAMINE HYDROCHLORIDE PRN MG: 25 CAPSULE ORAL at 16:07

## 2018-03-03 RX ADMIN — SODIUM CHLORIDE PRN ML: 0.45 INJECTION, SOLUTION INTRAVENOUS at 02:10

## 2018-03-03 RX ADMIN — HYDROMORPHONE HYDROCHLORIDE PRN MG: 2 INJECTION INTRAMUSCULAR; INTRAVENOUS; SUBCUTANEOUS at 21:05

## 2018-03-03 RX ADMIN — HYDROMORPHONE HYDROCHLORIDE PRN MG: 2 INJECTION INTRAMUSCULAR; INTRAVENOUS; SUBCUTANEOUS at 16:06

## 2018-03-03 RX ADMIN — CEFTRIAXONE SODIUM SCH MG: 1 INJECTION, POWDER, FOR SOLUTION INTRAMUSCULAR; INTRAVENOUS at 18:17

## 2018-03-03 RX ADMIN — HYDROMORPHONE HYDROCHLORIDE PRN MG: 2 INJECTION INTRAMUSCULAR; INTRAVENOUS; SUBCUTANEOUS at 02:10

## 2018-03-03 RX ADMIN — POLYETHYLENE GLYCOL 3350 SCH GM: 17 POWDER, FOR SOLUTION ORAL at 11:23

## 2018-03-03 RX ADMIN — Medication SCH ML: at 21:07

## 2018-03-03 RX ADMIN — DIPHENHYDRAMINE HYDROCHLORIDE PRN MG: 25 CAPSULE ORAL at 21:07

## 2018-03-03 RX ADMIN — APIXABAN SCH MG: 5 TABLET, FILM COATED ORAL at 11:23

## 2018-03-03 RX ADMIN — LOSARTAN POTASSIUM SCH MG: 50 TABLET, FILM COATED ORAL at 11:22

## 2018-03-03 RX ADMIN — LETROZOLE SCH MG: 2.5 TABLET, FILM COATED ORAL at 11:23

## 2018-03-03 RX ADMIN — LANSOPRAZOLE SCH MG: 30 TABLET, ORALLY DISINTEGRATING, DELAYED RELEASE ORAL at 05:42

## 2018-03-03 RX ADMIN — APIXABAN SCH MG: 5 TABLET, FILM COATED ORAL at 17:33

## 2018-03-03 RX ADMIN — DIPHENHYDRAMINE HYDROCHLORIDE PRN MG: 25 CAPSULE ORAL at 06:35

## 2018-03-03 NOTE — PDOC PROGRESS REPORT
Subjective


Progress Note for:: 03/03/18


Subjective:: 





Patient admitted with abdominal pain which is apparently improved today.  She 

has been seen by the surgeon.  She had minimal bowel movement overnight.  She 

has had some nausea but no vomiting.  Sodium was noted to be low at 121.





Reason For Visit: 


INTRACTABLE ABDOMINAL PAIN AND NAUSEA








Physical Exam


Vital Signs: 


 











Temp Pulse Resp BP Pulse Ox


 


 97.9 F   74   16   116/54 L  95 


 


 03/03/18 15:05  03/03/18 15:05  03/03/18 15:05  03/03/18 15:05  03/03/18 15:05








 Intake & Output











 03/02/18 03/03/18 03/04/18





 06:59 06:59 06:59


 


Intake Total 6 2021 1020


 


Output Total  700 


 


Balance 6 1321 1020


 


Weight 64.5 kg 64.4 kg 











General appearance: PRESENT: no acute distress


Head exam: PRESENT: atraumatic


Neck exam: ABSENT: carotid bruit, JVD, lymphadenopathy, thyromegaly


Respiratory exam: PRESENT: clear to auscultation isreal.  ABSENT: rales, rhonchi, 

wheezes


Cardiovascular exam: PRESENT: bradycardia


GI/Abdominal exam: PRESENT: normal bowel sounds, soft, tenderness - Lower 

quadrants


Rectal exam: PRESENT: deferred


Musculoskeletal exam: PRESENT: ambulatory


Neurological exam: PRESENT: alert, awake, oriented to person, oriented to place

, oriented to time, oriented to situation, CN II-XII grossly intact.  ABSENT: 

motor sensory deficit


Skin exam: PRESENT: other - Status post mastectomy





Results


Laboratory Results: 


 





 03/03/18 03:55 





 03/03/18 03:55 





 











  03/03/18 03/03/18





  03:55 03:55


 


WBC  8.2 


 


RBC  4.36 


 


Hgb  13.3 


 


Hct  38.5 


 


MCV  88 


 


MCH  30.5 


 


MCHC  34.5 


 


RDW  13.6 


 


Plt Count  208 


 


Seg Neutrophils %  79.6 H 


 


Lymphocytes %  10.6 L 


 


Monocytes %  9.5 


 


Eosinophils %  0.2 


 


Basophils %  0.1 


 


Absolute Neutrophils  6.5 


 


Absolute Lymphocytes  0.9 


 


Absolute Monocytes  0.8 


 


Absolute Eosinophils  0.0 


 


Absolute Basophils  0.0 


 


Sodium   121.5 L


 


Potassium   3.9


 


Chloride   88 L


 


Carbon Dioxide   26


 


Anion Gap   8


 


BUN   10


 


Creatinine   0.47 L


 


Est GFR ( Amer)   > 60


 


Est GFR (Non-Af Amer)   > 60


 


Glucose   137 H


 


Calcium   8.8











Impressions: 


 





Abdomen/Pelvis CT  03/01/18 04:25


IMPRESSION:


 


1.  Moderately mesenteric fat inflammation of the right


paracentral abdomen. Infectious, inflammatory, neoplastic


processes are in the differential diagnosis.


2.  Possible 0.3 cm cholelithiasis/choledocholithiasis.


3.  Grade 2 L5 anterolisthesis with chronic bilateral L5


spondylolyses, and moderate-severe bilateral L5 foraminal


stenoses.


 


 








Abdomen Ultrasound  03/01/18 06:54


IMPRESSION:  NORMAL RIGHT UPPER QUADRANT ULTRASOUND.  The suspected gallstone 

identified on the CT is not identified on the basis of the ultrasound 

examination.


 








Abdomen MRI  03/01/18 08:47


IMPRESSION:  RELATIVELY UNREMARKABLE MRI OF THE ABDOMEN WITHOUT CONTRAST.  

TRACE FREE FLUID IN THE UPPER ABDOMEN OF UNCLEAR ETIOLOGY OR SIGNIFICANCE.  NO 

OTHER SIGNIFICANT FINDINGS.


 














Assessment & Plan





- Time


Time Spent with patient: 15-24 minutes


Medications reviewed and adjusted accordingly: Yes





- Plan Summary


Plan Summary: 








Nausea and vomiting associated with acute onset of right-sided abdominal pain.  

Workup so far has been grossly negative.  Plan is to continue with gentle 

laxatives in hopes that that will relieve her abdominal pain.


2.  Breast cancer status post mastectomy currently on letrozole


3.  Gastric bypass status post history of gastric sleeve 1 year ago.


4.  Hypertension we will continue current medications

## 2018-03-03 NOTE — PDOC PROGRESS REPORT
Subjective


Progress Note for:: 03/03/18


Subjective:: 





Less pains RLQ


Reason For Visit: 


INTRACTABLE ABDOMINAL PAIN AND NAUSEA








Physical Exam


Vital Signs: 


 











Temp Pulse Resp BP Pulse Ox


 


 97.9 F   74   16   116/54 L  95 


 


 03/03/18 15:05  03/03/18 15:05  03/03/18 15:05  03/03/18 15:05  03/03/18 15:05








 Intake & Output











 03/02/18 03/03/18 03/04/18





 06:59 06:59 06:59


 


Intake Total 6 2021 1020


 


Output Total  700 


 


Balance 6 1321 1020


 


Weight 64.5 kg 64.4 kg 











Exam: 





 at the RLQ but less than yesterday


Still constipated





Results


Laboratory Results: 


 





 03/03/18 03:55 





 03/03/18 03:55 





 











  03/03/18 03/03/18





  03:55 03:55


 


WBC  8.2 


 


RBC  4.36 


 


Hgb  13.3 


 


Hct  38.5 


 


MCV  88 


 


MCH  30.5 


 


MCHC  34.5 


 


RDW  13.6 


 


Plt Count  208 


 


Seg Neutrophils %  79.6 H 


 


Lymphocytes %  10.6 L 


 


Monocytes %  9.5 


 


Eosinophils %  0.2 


 


Basophils %  0.1 


 


Absolute Neutrophils  6.5 


 


Absolute Lymphocytes  0.9 


 


Absolute Monocytes  0.8 


 


Absolute Eosinophils  0.0 


 


Absolute Basophils  0.0 


 


Sodium   121.5 L


 


Potassium   3.9


 


Chloride   88 L


 


Carbon Dioxide   26


 


Anion Gap   8


 


BUN   10


 


Creatinine   0.47 L


 


Est GFR ( Amer)   > 60


 


Est GFR (Non-Af Amer)   > 60


 


Glucose   137 H


 


Calcium   8.8











Impressions: 


 





Abdomen/Pelvis CT  03/01/18 04:25


IMPRESSION:


 


1.  Moderately mesenteric fat inflammation of the right


paracentral abdomen. Infectious, inflammatory, neoplastic


processes are in the differential diagnosis.


2.  Possible 0.3 cm cholelithiasis/choledocholithiasis.


3.  Grade 2 L5 anterolisthesis with chronic bilateral L5


spondylolyses, and moderate-severe bilateral L5 foraminal


stenoses.


 


 








Abdomen Ultrasound  03/01/18 06:54


IMPRESSION:  NORMAL RIGHT UPPER QUADRANT ULTRASOUND.  The suspected gallstone 

identified on the CT is not identified on the basis of the ultrasound 

examination.


 








Abdomen MRI  03/01/18 08:47


IMPRESSION:  RELATIVELY UNREMARKABLE MRI OF THE ABDOMEN WITHOUT CONTRAST.  

TRACE FREE FLUID IN THE UPPER ABDOMEN OF UNCLEAR ETIOLOGY OR SIGNIFICANCE.  NO 

OTHER SIGNIFICANT FINDINGS.


 














Assessment & Plan





- Diagnosis


(1) UTI (urinary tract infection)


Qualifiers: 


   Urinary tract infection type: site unspecified 


Is this a current diagnosis for this admission?: Yes   





- Time


Time Spent with patient: 15-24 minutes





- Plan Summary


Plan Summary: 





Try Mineral oil laxative


Will eventually need colonoscopy

## 2018-03-04 LAB
ADD MANUAL DIFF: NO
ANION GAP SERPL CALC-SCNC: 8 MMOL/L (ref 5–19)
BASOPHILS # BLD AUTO: 0 10^3/UL (ref 0–0.2)
BASOPHILS NFR BLD AUTO: 0.3 % (ref 0–2)
BUN SERPL-MCNC: 9 MG/DL (ref 7–20)
CALCIUM: 9.2 MG/DL (ref 8.4–10.2)
CHLORIDE SERPL-SCNC: 93 MMOL/L (ref 98–107)
CO2 SERPL-SCNC: 28 MMOL/L (ref 22–30)
EOSINOPHIL # BLD AUTO: 0.1 10^3/UL (ref 0–0.6)
EOSINOPHIL NFR BLD AUTO: 1.7 % (ref 0–6)
ERYTHROCYTE [DISTWIDTH] IN BLOOD BY AUTOMATED COUNT: 13.9 % (ref 11.5–14)
GLUCOSE SERPL-MCNC: 119 MG/DL (ref 75–110)
HCT VFR BLD CALC: 40.9 % (ref 36–47)
HGB BLD-MCNC: 13.9 G/DL (ref 12–15.5)
LYMPHOCYTES # BLD AUTO: 1.7 10^3/UL (ref 0.5–4.7)
LYMPHOCYTES NFR BLD AUTO: 22.5 % (ref 13–45)
MCH RBC QN AUTO: 30.3 PG (ref 27–33.4)
MCHC RBC AUTO-ENTMCNC: 34 G/DL (ref 32–36)
MCV RBC AUTO: 89 FL (ref 80–97)
MONOCYTES # BLD AUTO: 0.9 10^3/UL (ref 0.1–1.4)
MONOCYTES NFR BLD AUTO: 11.7 % (ref 3–13)
NEUTROPHILS # BLD AUTO: 4.7 10^3/UL (ref 1.7–8.2)
NEUTS SEG NFR BLD AUTO: 63.8 % (ref 42–78)
PLATELET # BLD: 208 10^3/UL (ref 150–450)
POTASSIUM SERPL-SCNC: 4.4 MMOL/L (ref 3.6–5)
RBC # BLD AUTO: 4.59 10^6/UL (ref 3.72–5.28)
SODIUM SERPL-SCNC: 129 MMOL/L (ref 137–145)
TOTAL CELLS COUNTED % (AUTO): 100 %
WBC # BLD AUTO: 7.4 10^3/UL (ref 4–10.5)

## 2018-03-04 PROCEDURE — 0D9670Z DRAINAGE OF STOMACH WITH DRAINAGE DEVICE, VIA NATURAL OR ARTIFICIAL OPENING: ICD-10-PCS | Performed by: INTERNAL MEDICINE

## 2018-03-04 RX ADMIN — AMLODIPINE BESYLATE SCH MG: 5 TABLET ORAL at 10:40

## 2018-03-04 RX ADMIN — LOSARTAN POTASSIUM SCH MG: 50 TABLET, FILM COATED ORAL at 10:40

## 2018-03-04 RX ADMIN — Medication SCH ML: at 14:50

## 2018-03-04 RX ADMIN — POLYETHYLENE GLYCOL 3350 SCH GM: 17 POWDER, FOR SOLUTION ORAL at 10:40

## 2018-03-04 RX ADMIN — HYDROMORPHONE HYDROCHLORIDE PRN MG: 2 INJECTION INTRAMUSCULAR; INTRAVENOUS; SUBCUTANEOUS at 19:09

## 2018-03-04 RX ADMIN — Medication SCH ML: at 05:22

## 2018-03-04 RX ADMIN — DIPHENHYDRAMINE HYDROCHLORIDE PRN MG: 12.5 SOLUTION ORAL at 23:02

## 2018-03-04 RX ADMIN — DOCUSATE SODIUM SCH MG: 100 CAPSULE, LIQUID FILLED ORAL at 10:40

## 2018-03-04 RX ADMIN — METOPROLOL TARTRATE SCH MG: 25 TABLET, FILM COATED ORAL at 23:02

## 2018-03-04 RX ADMIN — CEFTRIAXONE SODIUM SCH MG: 1 INJECTION, POWDER, FOR SOLUTION INTRAMUSCULAR; INTRAVENOUS at 19:08

## 2018-03-04 RX ADMIN — Medication SCH ML: at 22:07

## 2018-03-04 RX ADMIN — HYDROMORPHONE HYDROCHLORIDE PRN MG: 2 INJECTION INTRAMUSCULAR; INTRAVENOUS; SUBCUTANEOUS at 23:02

## 2018-03-04 RX ADMIN — APIXABAN SCH MG: 5 TABLET, FILM COATED ORAL at 19:09

## 2018-03-04 RX ADMIN — LETROZOLE SCH MG: 2.5 TABLET, FILM COATED ORAL at 10:40

## 2018-03-04 RX ADMIN — LANSOPRAZOLE SCH MG: 30 TABLET, ORALLY DISINTEGRATING, DELAYED RELEASE ORAL at 05:39

## 2018-03-04 RX ADMIN — APIXABAN SCH MG: 5 TABLET, FILM COATED ORAL at 10:40

## 2018-03-04 RX ADMIN — DOCUSATE SODIUM SCH MG: 100 CAPSULE, LIQUID FILLED ORAL at 19:12

## 2018-03-04 RX ADMIN — DIPHENHYDRAMINE HYDROCHLORIDE PRN MG: 12.5 SOLUTION ORAL at 19:09

## 2018-03-04 NOTE — PDOC PROGRESS REPORT
Subjective


Progress Note for:: 03/04/18


Subjective:: 





Patient admitted with abdominal pain which is apparently improved today.  She 

has been seen by the surgeon.  She had minimal bowel movement overnight.  


Patient was anxious to go home and says she was feeling better.  I placed down 

the soft diet and even before she has patient threw up about 250 mL of greenish 

liquid.  She has been made n.p.o. again and I will obtain KUB to further 

reassess.


Reason For Visit: 


INTRACTABLE ABDOMINAL PAIN AND NAUSEA








Physical Exam


Vital Signs: 


 











Temp Pulse Resp BP Pulse Ox


 


 97.7 F   96   20   139/77 H  96 


 


 03/04/18 07:17  03/04/18 07:17  03/04/18 07:17  03/04/18 07:17  03/04/18 07:17











General appearance: PRESENT: no acute distress, well-developed


Head exam: PRESENT: atraumatic


Eye exam: PRESENT: conjunctiva pink, EOMI, PERRLA.  ABSENT: scleral icterus


Neck exam: ABSENT: carotid bruit, JVD, lymphadenopathy, thyromegaly


Cardiovascular exam: PRESENT: RRR.  ABSENT: diastolic murmur, rubs, systolic 

murmur


Pulses: PRESENT: normal dorsalis pedis pul


GI/Abdominal exam: PRESENT: tenderness - minimal


Rectal exam: PRESENT: deferred


Musculoskeletal exam: PRESENT: ambulatory


Neurological exam: PRESENT: alert, awake, oriented to person, oriented to place

, oriented to time, oriented to situation, CN II-XII grossly intact.  ABSENT: 

motor sensory deficit





Results


Laboratory Results: 





 Labs- All tests 24 hr











  03/04/18 03/04/18





  04:14 04:14


 


WBC   7.4


 


RBC   4.59


 


Hgb   13.9


 


Hct   40.9


 


MCV   89


 


MCH   30.3


 


MCHC   34.0


 


RDW   13.9


 


Plt Count   208


 


Seg Neutrophils %   63.8


 


Lymphocytes %   22.5


 


Monocytes %   11.7


 


Eosinophils %   1.7


 


Basophils %   0.3


 


Absolute Neutrophils   4.7


 


Absolute Lymphocytes   1.7


 


Absolute Monocytes   0.9


 


Absolute Eosinophils   0.1


 


Absolute Basophils   0.0


 


Sodium  129.0 L 


 


Potassium  4.4 


 


Chloride  93 L 


 


Carbon Dioxide  28 


 


Anion Gap  8 


 


BUN  9 


 


Creatinine  0.57 


 


Est GFR ( Amer)  > 60 


 


Est GFR (Non-Af Amer)  > 60 


 


Glucose  119 H 


 


Calcium  9.2 











Impressions: 


 





Abdomen/Pelvis CT  03/01/18 04:25


IMPRESSION:


 


1.  Moderately mesenteric fat inflammation of the right


paracentral abdomen. Infectious, inflammatory, neoplastic


processes are in the differential diagnosis.


2.  Possible 0.3 cm cholelithiasis/choledocholithiasis.


3.  Grade 2 L5 anterolisthesis with chronic bilateral L5


spondylolyses, and moderate-severe bilateral L5 foraminal


stenoses.


 


 








Abdomen Ultrasound  03/01/18 06:54


IMPRESSION:  NORMAL RIGHT UPPER QUADRANT ULTRASOUND.  The suspected gallstone 

identified on the CT is not identified on the basis of the ultrasound 

examination.


 








Abdomen MRI  03/01/18 08:47


IMPRESSION:  RELATIVELY UNREMARKABLE MRI OF THE ABDOMEN WITHOUT CONTRAST.  

TRACE FREE FLUID IN THE UPPER ABDOMEN OF UNCLEAR ETIOLOGY OR SIGNIFICANCE.  NO 

OTHER SIGNIFICANT FINDINGS.


 














Assessment & Plan





- Time


Time Spent with patient: 15-24 minutes


Medications reviewed and adjusted accordingly: Yes


Anticipated discharge: Home





- Inpatient Certification


Medical Necessity: Need For IV Fluids, Risk of Complication if Not Cared For in 

Hospital





- Plan Summary


Plan Summary: 








Nausea and vomiting associated with acute onset of right-sided abdominal pain.  

Workup remains negative.  Patient was feeling better but then threw up again 

and so KUB will be obtained for further evaluation.


2.  Breast cancer status post mastectomy currently on letrozole


3.  Gastric bypass status post history of gastric sleeve 1 year ago.


4.  Hypertension we will continue current medications


5.  Hyponatremia-sodium level was 121 yesterday but he has come up to 129 again 

today which appears to be somewhat around her baseline.  Her IV fluid was 

changed from hypotonic fluid to isotonic normal saline.  Will continue with 

this and recheck sodium values in a.m.

## 2018-03-04 NOTE — RADIOLOGY REPORT (SQ)
EXAM DESCRIPTION:  KUB/ABDOMEN (SINGLE VIEW)



COMPLETED DATE/TIME:  3/4/2018 7:21 pm



REASON FOR STUDY:  Check Placement of NG Tube



COMPARISON:  Earlier the same day



NUMBER OF VIEWS:  One view.



TECHNIQUE:   Supine radiographic image of the abdomen acquired.



LIMITATIONS:  None.



FINDINGS:  Nasogastric tube in the stomach.  Dilated loops small bowel not significantly changed.



IMPRESSION:  NG tube in the stomach.



Reading location - IP/workstation name: [email or web address]

## 2018-03-04 NOTE — RADIOLOGY REPORT (SQ)
EXAM DESCRIPTION:  KUB/ABDOMEN (SINGLE VIEW)



COMPLETED DATE/TIME:  3/4/2018 3:00 pm



REASON FOR STUDY:  Abdominal Pain, nausea A04.8  OTHER SPECIFIED BACTERIAL INTESTINAL INFECTIONS A08.
39  OTHER VIRAL ENTERITIS



COMPARISON:  None.



NUMBER OF VIEWS:  One view.



TECHNIQUE:   Supine radiographic image of the abdomen acquired.



LIMITATIONS:  None.



FINDINGS:  BOWEL GAS PATTERN: Diffuse small bowel dilation.

CALCIFICATIONS: No suspicious calcifications.

SOFT TISSUES: No gross mass or suggestion of organomegaly.

HARDWARE: None in the abdomen.

BONES: No acute fracture. No worrisome bone lesions.

OTHER: No other significant finding.



IMPRESSION:  DIFFUSE SMALL BOWEL DILATION, CONCERNING FOR SMALL BOWEL OBSTRUCTION.



TECHNICAL DOCUMENTATION:  JOB ID:  [id number]

 2011 [employer]- All Rights Reserved



Reading location - IP/workstation name: [email or web address]

## 2018-03-04 NOTE — PDOC PROGRESS REPORT
Subjective


Progress Note for:: 03/04/18


Subjective:: 





Less pains at the RLQ


Reason For Visit: 


INTRACTABLE ABDOMINAL PAIN AND NAUSEA








Physical Exam


Vital Signs: 


 











Temp Pulse Resp BP Pulse Ox


 


 98.2 F   112 H  16   94/67 L  95 


 


 03/04/18 15:54  03/04/18 15:54  03/04/18 15:54  03/04/18 15:54  03/04/18 15:54








 Intake & Output











 03/03/18 03/04/18 03/05/18





 06:59 06:59 06:59


 


Intake Total   900


 


Balance   900











Exam: 





abd distended. Mild tenderness RLQ





Results


Impressions: 


 





Abdomen/Pelvis CT  03/01/18 04:25


IMPRESSION:


 


1.  Moderately mesenteric fat inflammation of the right


paracentral abdomen. Infectious, inflammatory, neoplastic


processes are in the differential diagnosis.


2.  Possible 0.3 cm cholelithiasis/choledocholithiasis.


3.  Grade 2 L5 anterolisthesis with chronic bilateral L5


spondylolyses, and moderate-severe bilateral L5 foraminal


stenoses.


 


 








Abdomen Ultrasound  03/01/18 06:54


IMPRESSION:  NORMAL RIGHT UPPER QUADRANT ULTRASOUND.  The suspected gallstone 

identified on the CT is not identified on the basis of the ultrasound 

examination.


 








Abdomen MRI  03/01/18 08:47


IMPRESSION:  RELATIVELY UNREMARKABLE MRI OF THE ABDOMEN WITHOUT CONTRAST.  

TRACE FREE FLUID IN THE UPPER ABDOMEN OF UNCLEAR ETIOLOGY OR SIGNIFICANCE.  NO 

OTHER SIGNIFICANT FINDINGS.


 








KUB X-Ray  03/04/18 15:46


IMPRESSION:  NG tube in the stomach.


 














Assessment & Plan





- Diagnosis


(1) UTI (urinary tract infection)


Qualifiers: 


   Urinary tract infection type: site unspecified 


Is this a current diagnosis for this admission?: Yes   





- Time


Time Spent with patient: 15-24 minutes





- Plan Summary


Plan Summary: 





KUB showed dilated small bowel possible SBO


NGT in place.Will likely need colonoscopy to help us with finding etiology of 

pains

## 2018-03-05 LAB
ADD MANUAL DIFF: NO
ANION GAP SERPL CALC-SCNC: 5 MMOL/L (ref 5–19)
BASOPHILS # BLD AUTO: 0 10^3/UL (ref 0–0.2)
BASOPHILS NFR BLD AUTO: 0.8 % (ref 0–2)
BUN SERPL-MCNC: 10 MG/DL (ref 7–20)
CALCIUM: 8.5 MG/DL (ref 8.4–10.2)
CHLORIDE SERPL-SCNC: 104 MMOL/L (ref 98–107)
CO2 SERPL-SCNC: 27 MMOL/L (ref 22–30)
EOSINOPHIL # BLD AUTO: 0.1 10^3/UL (ref 0–0.6)
EOSINOPHIL NFR BLD AUTO: 2.1 % (ref 0–6)
ERYTHROCYTE [DISTWIDTH] IN BLOOD BY AUTOMATED COUNT: 14 % (ref 11.5–14)
GLUCOSE SERPL-MCNC: 111 MG/DL (ref 75–110)
HCT VFR BLD CALC: 38.5 % (ref 36–47)
HGB BLD-MCNC: 13 G/DL (ref 12–15.5)
LIPASE SERPL-CCNC: 35 U/L (ref 23–300)
LYMPHOCYTES # BLD AUTO: 1.3 10^3/UL (ref 0.5–4.7)
LYMPHOCYTES NFR BLD AUTO: 26 % (ref 13–45)
MCH RBC QN AUTO: 30.2 PG (ref 27–33.4)
MCHC RBC AUTO-ENTMCNC: 33.8 G/DL (ref 32–36)
MCV RBC AUTO: 90 FL (ref 80–97)
MONOCYTES # BLD AUTO: 0.7 10^3/UL (ref 0.1–1.4)
MONOCYTES NFR BLD AUTO: 14.5 % (ref 3–13)
NEUTROPHILS # BLD AUTO: 2.8 10^3/UL (ref 1.7–8.2)
NEUTS SEG NFR BLD AUTO: 56.6 % (ref 42–78)
PLATELET # BLD: 228 10^3/UL (ref 150–450)
POTASSIUM SERPL-SCNC: 4.1 MMOL/L (ref 3.6–5)
RBC # BLD AUTO: 4.3 10^6/UL (ref 3.72–5.28)
SODIUM SERPL-SCNC: 136.2 MMOL/L (ref 137–145)
TOTAL CELLS COUNTED % (AUTO): 100 %
WBC # BLD AUTO: 5 10^3/UL (ref 4–10.5)

## 2018-03-05 RX ADMIN — Medication PRN EACH: at 17:24

## 2018-03-05 RX ADMIN — METOPROLOL TARTRATE SCH MG: 25 TABLET, FILM COATED ORAL at 09:48

## 2018-03-05 RX ADMIN — APIXABAN SCH MG: 5 TABLET, FILM COATED ORAL at 17:22

## 2018-03-05 RX ADMIN — Medication SCH ML: at 05:15

## 2018-03-05 RX ADMIN — Medication SCH ML: at 15:51

## 2018-03-05 RX ADMIN — METOPROLOL TARTRATE SCH MG: 25 TABLET, FILM COATED ORAL at 20:56

## 2018-03-05 RX ADMIN — DOCUSATE SODIUM SCH MG: 100 CAPSULE, LIQUID FILLED ORAL at 09:40

## 2018-03-05 RX ADMIN — APIXABAN SCH MG: 5 TABLET, FILM COATED ORAL at 09:48

## 2018-03-05 RX ADMIN — Medication SCH ML: at 21:02

## 2018-03-05 RX ADMIN — DOCUSATE SODIUM SCH MG: 100 CAPSULE, LIQUID FILLED ORAL at 17:25

## 2018-03-05 RX ADMIN — Medication PRN EACH: at 19:12

## 2018-03-05 RX ADMIN — CEFTRIAXONE SODIUM SCH MG: 1 INJECTION, POWDER, FOR SOLUTION INTRAMUSCULAR; INTRAVENOUS at 17:23

## 2018-03-05 RX ADMIN — LANSOPRAZOLE SCH MG: 30 TABLET, ORALLY DISINTEGRATING, DELAYED RELEASE ORAL at 06:26

## 2018-03-05 NOTE — PDOC PROGRESS REPORT
Subjective


Progress Note for:: 03/05/18


Reason For Visit: 


INTRACTABLE ABDOMINAL PAIN AND NAUSEA


Patient had a big bowel movement, feels better; minimal drainage from NG tube 

after 350 drained overnight





Physical Exam


Vital Signs: 


 











Temp Pulse Resp BP Pulse Ox


 


 98.2 F   73   18   127/63 H  99 


 


 03/05/18 16:23  03/05/18 16:23  03/05/18 16:23  03/05/18 16:23  03/05/18 16:23








 Intake & Output











 03/04/18 03/05/18 03/06/18





 06:59 06:59 06:59


 


Intake Total  1775 


 


Output Total  300 


 


Balance  1475 


 


Weight  68.6 kg 











General appearance: PRESENT: no acute distress


GI/Abdominal exam: PRESENT: other - The abdomen is not distended; no peritoneal 

signs no rigidity.





Results


Laboratory Results: 


 





 03/05/18 07:29 





 03/05/18 07:29 





 











  03/05/18 03/05/18 03/05/18





  07:29 07:29 07:29


 


WBC  5.0  


 


RBC  4.30  


 


Hgb  13.0  


 


Hct  38.5  


 


MCV  90  


 


MCH  30.2  


 


MCHC  33.8  


 


RDW  14.0  


 


Plt Count  228  


 


Seg Neutrophils %  56.6  


 


Lymphocytes %  26.0  


 


Monocytes %  14.5 H  


 


Eosinophils %  2.1  


 


Basophils %  0.8  


 


Absolute Neutrophils  2.8  


 


Absolute Lymphocytes  1.3  


 


Absolute Monocytes  0.7  


 


Absolute Eosinophils  0.1  


 


Absolute Basophils  0.0  


 


Sodium   136.2 L 


 


Potassium   4.1 


 


Chloride   104 


 


Carbon Dioxide   27 


 


Anion Gap   5 


 


BUN   10 


 


Creatinine   0.58 


 


Est GFR ( Amer)   > 60 


 


Est GFR (Non-Af Amer)   > 60 


 


Glucose   111 H 


 


Lactic Acid    0.7


 


Calcium   8.5 


 


Magnesium   1.6 


 


Lipase   35.0 











Impressions: 


 





Abdomen/Pelvis CT  03/01/18 04:25


IMPRESSION:


 


1.  Moderately mesenteric fat inflammation of the right


paracentral abdomen. Infectious, inflammatory, neoplastic


processes are in the differential diagnosis.


2.  Possible 0.3 cm cholelithiasis/choledocholithiasis.


3.  Grade 2 L5 anterolisthesis with chronic bilateral L5


spondylolyses, and moderate-severe bilateral L5 foraminal


stenoses.


 


 








Abdomen Ultrasound  03/01/18 06:54


IMPRESSION:  NORMAL RIGHT UPPER QUADRANT ULTRASOUND.  The suspected gallstone 

identified on the CT is not identified on the basis of the ultrasound 

examination.


 








Abdomen MRI  03/01/18 08:47


IMPRESSION:  RELATIVELY UNREMARKABLE MRI OF THE ABDOMEN WITHOUT CONTRAST.  

TRACE FREE FLUID IN THE UPPER ABDOMEN OF UNCLEAR ETIOLOGY OR SIGNIFICANCE.  NO 

OTHER SIGNIFICANT FINDINGS.


 








KUB X-Ray  03/05/18 00:00


IMPRESSION:  NO CHANGE IN APPEARANCE OF THE ABDOMEN.  CONTINUED SMALL BOWEL 

DILATION.


 














Assessment & Plan





- Diagnosis


(1) Nausea and vomiting


Qualifiers: 


   Vomiting type: unspecified   Vomiting Intractability: non-intractable   

Qualified Code(s): R11.2 - Nausea with vomiting, unspecified   


Is this a current diagnosis for this admission?: Yes   


Plan: 


Impression:





No physical exam or radiographic evidence of acute intra-abdominal pathology 

despite extensive evaluation











Plan:





1.  Clamp NG tube; if tolerated, anticipate discontinuation tomorrow











2.  No indication for further surgical intervention at this time

## 2018-03-05 NOTE — RADIOLOGY REPORT (SQ)
EXAM DESCRIPTION:  KUB/ABDOMEN (SINGLE VIEW)



COMPLETED DATE/TIME:  3/5/2018 2:00 pm



REASON FOR STUDY:  follow up KUB A04.8  OTHER SPECIFIED BACTERIAL INTESTINAL INFECTIONS A08.39  OTHER
 VIRAL ENTERITIS



COMPARISON:  3/4/2018.



NUMBER OF VIEWS:  One view.



TECHNIQUE:   Supine radiographic image of the abdomen acquired.



LIMITATIONS:  None.



FINDINGS:  BOWEL GAS PATTERN: Small bowel dilation unchanged.

CALCIFICATIONS: No suspicious calcifications.

SOFT TISSUES: No gross mass or suggestion of organomegaly.

HARDWARE: Nasogastric tube with the tip in the stomach.

BONES: No acute fracture. No worrisome bone lesions.

OTHER: No other significant finding.



IMPRESSION:  NO CHANGE IN APPEARANCE OF THE ABDOMEN.  CONTINUED SMALL BOWEL DILATION.



TECHNICAL DOCUMENTATION:  JOB ID:  [id number]

 2011 [employer]- All Rights Reserved



Reading location - IP/workstation name: [email or web address]

## 2018-03-05 NOTE — PDOC PROGRESS REPORT
Subjective


Progress Note for:: 03/05/18


Subjective:: 





Patient admitted with abdominal pain which is apparently improved today.  She 

has been seen by the surgeon.  She had minimal bowel movement overnight.  


She had little output from NGT and apparently had a large BM. This likely has 

relieved SBO. Will obtain repeat KUB today


Reason For Visit: 


INTRACTABLE ABDOMINAL PAIN AND NAUSEA








Physical Exam


Vital Signs: 


 











Temp Pulse Resp BP Pulse Ox


 


 98.2 F   73   18   127/63 H  99 


 


 03/05/18 16:23  03/05/18 16:23  03/05/18 16:23  03/05/18 16:23  03/05/18 16:23








 Intake & Output











 03/04/18 03/05/18 03/06/18





 06:59 06:59 06:59


 


Intake Total  1775 900


 


Output Total  300 100


 


Balance  1475 800


 


Weight  68.6 kg 











General appearance: PRESENT: no acute distress, other - NGT in place


Head exam: PRESENT: atraumatic


Mouth exam: PRESENT: dry mucosa


Respiratory exam: PRESENT: clear to auscultation isreal.  ABSENT: rales, rhonchi, 

wheezes


Cardiovascular exam: PRESENT: RRR.  ABSENT: diastolic murmur, rubs, systolic 

murmur


GI/Abdominal exam: PRESENT: hyperactive bowel sounds, tenderness


Rectal exam: PRESENT: deferred


Neurological exam: PRESENT: alert, awake, oriented to person, oriented to place

, oriented to time, oriented to situation, CN II-XII grossly intact.  ABSENT: 

motor sensory deficit





Results


Laboratory Results: 


 





 03/05/18 07:29 





 03/05/18 07:29 





 











  03/05/18 03/05/18 03/05/18





  07:29 07:29 07:29


 


WBC  5.0  


 


RBC  4.30  


 


Hgb  13.0  


 


Hct  38.5  


 


MCV  90  


 


MCH  30.2  


 


MCHC  33.8  


 


RDW  14.0  


 


Plt Count  228  


 


Seg Neutrophils %  56.6  


 


Lymphocytes %  26.0  


 


Monocytes %  14.5 H  


 


Eosinophils %  2.1  


 


Basophils %  0.8  


 


Absolute Neutrophils  2.8  


 


Absolute Lymphocytes  1.3  


 


Absolute Monocytes  0.7  


 


Absolute Eosinophils  0.1  


 


Absolute Basophils  0.0  


 


Sodium   136.2 L 


 


Potassium   4.1 


 


Chloride   104 


 


Carbon Dioxide   27 


 


Anion Gap   5 


 


BUN   10 


 


Creatinine   0.58 


 


Est GFR ( Amer)   > 60 


 


Est GFR (Non-Af Amer)   > 60 


 


Glucose   111 H 


 


Lactic Acid    0.7


 


Calcium   8.5 


 


Magnesium   1.6 


 


Lipase   35.0 











Impressions: 


 





Abdomen/Pelvis CT  03/01/18 04:25


IMPRESSION:


 


1.  Moderately mesenteric fat inflammation of the right


paracentral abdomen. Infectious, inflammatory, neoplastic


processes are in the differential diagnosis.


2.  Possible 0.3 cm cholelithiasis/choledocholithiasis.


3.  Grade 2 L5 anterolisthesis with chronic bilateral L5


spondylolyses, and moderate-severe bilateral L5 foraminal


stenoses.


 


 








Abdomen Ultrasound  03/01/18 06:54


IMPRESSION:  NORMAL RIGHT UPPER QUADRANT ULTRASOUND.  The suspected gallstone 

identified on the CT is not identified on the basis of the ultrasound 

examination.


 








Abdomen MRI  03/01/18 08:47


IMPRESSION:  RELATIVELY UNREMARKABLE MRI OF THE ABDOMEN WITHOUT CONTRAST.  

TRACE FREE FLUID IN THE UPPER ABDOMEN OF UNCLEAR ETIOLOGY OR SIGNIFICANCE.  NO 

OTHER SIGNIFICANT FINDINGS.


 








KUB X-Ray  03/05/18 00:00


IMPRESSION:  NO CHANGE IN APPEARANCE OF THE ABDOMEN.  CONTINUED SMALL BOWEL 

DILATION.


 














Assessment & Plan





- Time


Time Spent with patient: 15-24 minutes


Anticipated discharge: Home


Within: within 48 hours





- Plan Summary


Plan Summary: 








1.SBO- Appears to have relieved with BM. Can likely clamp NGT


2.  Breast cancer status post mastectomy currently on letrozole


3.  Gastric bypass status post history of gastric sleeve 1 year ago.


4.  Hypertension we will continue current medications


5.  Hyponatremia-sodium level has recovered

## 2018-03-06 VITALS — SYSTOLIC BLOOD PRESSURE: 145 MMHG | DIASTOLIC BLOOD PRESSURE: 71 MMHG

## 2018-03-06 RX ADMIN — Medication SCH ML: at 05:43

## 2018-03-06 RX ADMIN — LANSOPRAZOLE SCH MG: 30 TABLET, ORALLY DISINTEGRATING, DELAYED RELEASE ORAL at 05:43

## 2018-03-06 RX ADMIN — Medication PRN EACH: at 04:00

## 2018-04-19 NOTE — DISCHARGE SUMMARY E
Discharge Summary



NAME: JEANNE FAIR

MRN:  [id number]        : 1945     AGE: 72Y

ADMITTED: 2018                  DISCHARGED: 2018



FINAL DIAGNOSES:

1.  Post mastectomy, left side for breast cancer on letrozole.

2.  Atrial fibrillation on Eliquis.

3.  Post sleeve gastrectomy last year done at [facility].

4.  Hypertension.

5.  Hyponatremia.

6.  Arthritis of the lumbosacral spines.

7.  Small bowel obstruction due to constipation.



HOSPITAL COURSE:

This is a 72-year-old female who complained of severe right-sided

abdominal pains at 1 a.m. on 18.  The patient then went to the ED

where she was then admitted after a CT scan of the abdomen showed

mesenteric fat, inflammation along the right paracentral abdomen with a

differential of infectious versus neoplastic process.  She was then

admitted and an ultrasound of the gallbladder was negative for gallstones,

since there is a question of gallstones on a CT scan.  She also had an MRI

of the abdomen which was negative other than trace fluid in the upper

abdomen with questionable significance.  I saw her in consultation on

18 and noted to have evidence of urinary tract infection.  She had

tenderness in the right lower quadrant on deep palpation.  A KUB was done

on 18 which showed dilated loops of small bowel.  She was given a

dose of mineral oil on 18.  She continued to have the NG tube.  On

18, had a large bowel movement with complete resolution of all her

symptoms with a tenderness in the right lower quadrant has resolved.  NG

tube was removed and started on clear liquids and advanced to soft diet,

which she tolerated.  She was then discharged improved on 18 with

the above final diagnoses.



DISCHARGE INSTRUCTIONS:

The patient will need an outpatient colonoscopy.  Follow up also with her

bariatric surgeon at [facility].  She will resume all her medications.





DICTATING PHYSICIAN:  YORDAN SNOWDEN M.D.





[id number]                  DT: 2018    [id number]

PHY#: [id number]            DD: 2018

ID:   [id number]           JOB#: [id number]       ACCT: [id number]



cc:JORDY POWELL PA

>